# Patient Record
Sex: FEMALE | Race: WHITE
[De-identification: names, ages, dates, MRNs, and addresses within clinical notes are randomized per-mention and may not be internally consistent; named-entity substitution may affect disease eponyms.]

---

## 2017-08-19 ENCOUNTER — HEALTH MAINTENANCE LETTER (OUTPATIENT)
Age: 52
End: 2017-08-19

## 2017-11-17 ENCOUNTER — TELEPHONE (OUTPATIENT)
Dept: FAMILY MEDICINE | Facility: CLINIC | Age: 52
End: 2017-11-17

## 2017-11-18 NOTE — TELEPHONE ENCOUNTER
"11/17/17    Patient is due for a Cervical/Pap screening.    Patient Communication Preferences indicate  Do not contact  and/or communication by \"Phone\" is not preferred. Call not required per Outreach team.    Suze Caldera  "

## 2017-12-04 ENCOUNTER — OFFICE VISIT (OUTPATIENT)
Dept: FAMILY MEDICINE | Facility: CLINIC | Age: 52
End: 2017-12-04
Payer: COMMERCIAL

## 2017-12-04 VITALS
DIASTOLIC BLOOD PRESSURE: 68 MMHG | HEART RATE: 64 BPM | OXYGEN SATURATION: 99 % | RESPIRATION RATE: 16 BRPM | TEMPERATURE: 97.8 F | HEIGHT: 66 IN | SYSTOLIC BLOOD PRESSURE: 102 MMHG | WEIGHT: 121 LBS | BODY MASS INDEX: 19.44 KG/M2

## 2017-12-04 DIAGNOSIS — K21.9 GASTROESOPHAGEAL REFLUX DISEASE WITHOUT ESOPHAGITIS: ICD-10-CM

## 2017-12-04 DIAGNOSIS — G43.009 MIGRAINE WITHOUT AURA AND WITHOUT STATUS MIGRAINOSUS, NOT INTRACTABLE: ICD-10-CM

## 2017-12-04 DIAGNOSIS — Z00.00 ROUTINE GENERAL MEDICAL EXAMINATION AT A HEALTH CARE FACILITY: Primary | ICD-10-CM

## 2017-12-04 DIAGNOSIS — Z12.31 ENCOUNTER FOR SCREENING MAMMOGRAM FOR BREAST CANCER: ICD-10-CM

## 2017-12-04 DIAGNOSIS — Z23 NEED FOR VACCINATION: ICD-10-CM

## 2017-12-04 DIAGNOSIS — R87.618 UNEXPLAINED ENDOMETRIAL CELLS ON CERVICAL PAP SMEAR: ICD-10-CM

## 2017-12-04 LAB — HGB BLD-MCNC: 14.1 G/DL (ref 11.7–15.7)

## 2017-12-04 PROCEDURE — 99396 PREV VISIT EST AGE 40-64: CPT | Mod: 25 | Performed by: FAMILY MEDICINE

## 2017-12-04 PROCEDURE — 36415 COLL VENOUS BLD VENIPUNCTURE: CPT | Performed by: FAMILY MEDICINE

## 2017-12-04 PROCEDURE — 80053 COMPREHEN METABOLIC PANEL: CPT | Performed by: FAMILY MEDICINE

## 2017-12-04 PROCEDURE — 99213 OFFICE O/P EST LOW 20 MIN: CPT | Mod: 25 | Performed by: FAMILY MEDICINE

## 2017-12-04 PROCEDURE — 85018 HEMOGLOBIN: CPT | Performed by: FAMILY MEDICINE

## 2017-12-04 PROCEDURE — 90471 IMMUNIZATION ADMIN: CPT | Performed by: FAMILY MEDICINE

## 2017-12-04 PROCEDURE — G0145 SCR C/V CYTO,THINLAYER,RESCR: HCPCS | Performed by: FAMILY MEDICINE

## 2017-12-04 PROCEDURE — 80061 LIPID PANEL: CPT | Performed by: FAMILY MEDICINE

## 2017-12-04 PROCEDURE — 86803 HEPATITIS C AB TEST: CPT | Performed by: FAMILY MEDICINE

## 2017-12-04 PROCEDURE — 87624 HPV HI-RISK TYP POOLED RSLT: CPT | Performed by: FAMILY MEDICINE

## 2017-12-04 PROCEDURE — 90714 TD VACC NO PRESV 7 YRS+ IM: CPT | Performed by: FAMILY MEDICINE

## 2017-12-04 RX ORDER — RIZATRIPTAN BENZOATE 5 MG/1
5-10 TABLET ORAL
Qty: 18 TABLET | Refills: 3 | Status: SHIPPED | OUTPATIENT
Start: 2017-12-04

## 2017-12-04 NOTE — LETTER
February 22, 2018      Oren Lanza  [address] [facility] [address]        Dear ,    We are writing to inform you of your test results, we have tried multiple times to reach you by phone.    Please make an appointment for an endometrial biopsy, your last 2 pap smears have shown endometrial cells.     Resulted Orders   Pap imaged thin layer screen with HPV - recommended age 30 - 65 years (select HPV order below)   Result Value Ref Range    PAP OTHER-NIL, See Result     Copath Report         Patient Name: OREN LANZA  MR#: [id number]  Specimen #: [id number]  Collected: 12/4/2017  Received: 12/5/2017  Reported: 12/6/2017 10:24  Ordering Phy(s): NINFA MALLORY    For improved result formatting, select 'View Enhanced Report Format'  under Linked Documents section.    SPECIMEN/STAIN PROCESS:  Pap imaged thin layer prep screening (Surepath, FocalPoint with guided  screening)       Pap-Cyto x 1, HPV ordered x 1    SOURCE: Cervical, endocervical  ----------------------------------------------------------------   Pap imaged thin layer prep screening (Surepath, FocalPoint with guided  screening)  SPECIMEN ADEQUACY:  Satisfactory for evaluation.  -Transformation zone component present.    CYTOLOGIC INTERPRETATION:    Other: Negative for Intraepithelial Lesion or Malignancy, See  interpretation/Result.         Endometrial cells present in a woman 45 years of age or older     -Endometrial cells correlate with the menstrual history provided.    Electronically signed out by:  SHANA Morales (ASCP)    Processed and screened at [facility] [place] [facility],  [facility]    CLINICAL HISTORY:  LMP: 11/28/2017  Previous Other-NIL EM in a woman 45 years of age or older  Date of Last Pap: 6/17/2016,    Papanicolaou Test Limitations:  Cervical cytology is a screening test  with limited sensitivity; regular screening is critical for cancer  prevention; Pap tests are primarily  effective for the  diagnosis/prevention of squamous cell carcinoma, not adenocarcinomas or  other cancers.    TESTING LAB LOCATION:  [address]  [address] [phone]    COLLECTION SITE:  Client:  [facility]  Location: [facility] (S)     HPV High Risk Types DNA Cervical   Result Value Ref Range    HPV 16 DNA Negative NEG^Negative    HPV 18 DNA Negative NEG^Negative    Other HR HPV Negative NEG^Negative    Final Diagnosis This patient's sample is negative for HPV DNA.       Comment:      (Note)  METHODOLOGY:  The Roche alex 4800 system uses automated extraction,   simultaneous amplification of HPV (L1 region) and beta-globin,    followed by  real time detection of fluorescent labeled HPV and beta   globin using specific oligonucleotide probes . The test specifically   identifies types HPV 16 DNA and HPV 18 DNA while concurrently   detecting the rest of the high risk types (31, 33, 35, 39, 45, 51,   52, 56, 58, 59, 66 or 68).  COMMENTS:  This test is not intended for use as a screening device   for women under age 30 with normal cervical cytology.  Results should   be correlated with cytologic and histologic findings. Close clinical   followup is recommended.  This test was developed and its performance characteristics   determined by the [facility]. It has not been cleared or approved by the   FDA. The laboratory is regulated under CLIA as qualified to perform   high-complexity testing. This test is used   for clinical purposes. It   should not be regarded as investigational or for research.      Specimen Description Cervical Cells       Comment:      [id number]       Please call the clinic at the number listed above.       Sincerely,    Raegan Becerril MD

## 2017-12-04 NOTE — MR AVS SNAPSHOT
After Visit Summary   12/4/2017    Carmen Sánchez    MRN: [id number]           Patient Information     Date Of Birth          1965        Visit Information        Provider Department      12/4/2017 9:40 AM Raegan Becerril MD [facility]        Today's Diagnoses     Routine general medical examination at a health care facility    -  1    Migraine without aura and without status migrainosus, not intractable        Gastroesophageal reflux disease without esophagitis        Encounter for screening mammogram for breast cancer          Care Instructions      Preventive Health Recommendations  Female Ages 50 - 64    Yearly exam: See your health care provider every year in order to  o Review health changes.   o Discuss preventive care.    o Review your medicines if your doctor has prescribed any.      Get a Pap test every three years (unless you have an abnormal result and your provider advises testing more often).    If you get Pap tests with HPV test, you only need to test every 5 years, unless you have an abnormal result.     You do not need a Pap test if your uterus was removed (hysterectomy) and you have not had cancer.    You should be tested each year for STDs (sexually transmitted diseases) if you're at risk.     Have a mammogram every 1 to 2 years.    Have a colonoscopy at age 50, or have a yearly FIT test (stool test). These exams screen for colon cancer.      Have a cholesterol test every 5 years, or more often if advised.    Have a diabetes test (fasting glucose) every three years. If you are at risk for diabetes, you should have this test more often.     If you are at risk for osteoporosis (brittle bone disease), think about having a bone density scan (DEXA).    Shots: Get a flu shot each year. Get a tetanus shot every 10 years.    Nutrition:     Eat at least 5 servings of fruits and vegetables each day.    Eat whole-grain bread, whole-wheat pasta and brown rice instead of  white grains and rice.    Talk to your provider about Calcium and Vitamin D.     Lifestyle    Exercise at least 150 minutes a week (30 minutes a day, 5 days a week). This will help you control your weight and prevent disease.    Limit alcohol to one drink per day.    No smoking.     Wear sunscreen to prevent skin cancer.     See your dentist every six months for an exam and cleaning.    See your eye doctor every 1 to 2 years.            Follow-ups after your visit        Follow-up notes from your care team     Return in about 2 weeks (around 12/18/2017), or if symptoms worsen or fail to improve.      Future tests that were ordered for you today     Open Future Orders        Priority Expected Expires Ordered    *MA Screening Digital Bilateral Routine  12/4/2018 12/4/2017            Who to contact     If you have questions or need follow up information about today's clinic visit or your schedule please contact [facility] [place] directly at [phone].  Normal or non-critical lab and imaging results will be communicated to you by HAM-IThart, letter or phone within 4 business days after the clinic has received the results. If you do not hear from us within 7 days, please contact the clinic through HAM-IThart or phone. If you have a critical or abnormal lab result, we will notify you by phone as soon as possible.  Submit refill requests through Viableware or call your pharmacy and they will forward the refill request to us. Please allow 3 business days for your refill to be completed.          Additional Information About Your Visit        Viableware Information     Viableware gives you secure access to your electronic health record. If you see a primary care provider, you can also send messages to your care team and make appointments. If you have questions, please call your primary care clinic.  If you do not have a primary care provider, please call [phone] and they will assist you.        Care EveryWhere ID      "This is your Care EveryWhere ID. This could be used by other organizations to access your [place] medical records  [id number]        Your Vitals Were     Pulse Temperature Respirations Height Last Period Pulse Oximetry    64 97.8  F (36.6  C) 16 5' 6\" (1.676 m) 11/28/2017 (Exact Date) 99%    BMI (Body Mass Index)                   19.53 kg/m2            Blood Pressure from Last 3 Encounters:   12/04/17 102/68   06/17/16 115/78   02/23/16 100/64    Weight from Last 3 Encounters:   12/04/17 121 lb (54.9 kg)   06/17/16 120 lb (54.4 kg)   02/23/16 119 lb 12.8 oz (54.3 kg)              We Performed the Following     Comprehensive metabolic panel     Hemoglobin     Hepatitis C Screen Reflex to HCV RNA Quant and Genotype     HPV High Risk Types DNA Cervical     Lipid Profile     Pap imaged thin layer screen with HPV - recommended age 30 - 65 years (select HPV order below)          Today's Medication Changes          These changes are accurate as of: 12/4/17 10:44 AM.  If you have any questions, ask your nurse or doctor.               Start taking these medicines.        Dose/Directions    rizatriptan 5 MG tablet   Commonly known as:  MAXALT   Used for:  Migraine without aura and without status migrainosus, not intractable   Started by:  Raegan Becerril MD        Dose:  5-10 mg   Take 1-2 tablets (5-10 mg) by mouth at onset of headache for migraine May repeat in 2 hours. Max 6 tablets/24 hours.   Quantity:  18 tablet   Refills:  3         These medicines have changed or have updated prescriptions.        Dose/Directions    esomeprazole 20 MG CR capsule   Commonly known as:  nexIUM   This may have changed:  when to take this   Used for:  Gastroesophageal reflux disease without esophagitis   Changed by:  Raegan Becerril MD        Dose:  20 mg   Take 1 capsule (20 mg) by mouth 2 times daily (before meals)   Quantity:  60 capsule   Refills:  3         Stop taking these medicines if you haven't already. Please contact your care " team if you have questions.     SUMAtriptan 100 MG tablet   Commonly known as:  IMITREX   Stopped by:  Raegan Becerril MD                Where to get your medicines      These medications were sent to [facility] [id number] [facility] - [place] - [address]  [address], [place] [address]     Phone:  [phone]     esomeprazole 20 MG CR capsule    rizatriptan 5 MG tablet                Primary Care Provider Office Phone # Fax #    Raegan Becerril [phone]       [address]  [place] [address]        Equal Access to Services     [facility]: Hadii aad ku hadasho Soomaali, waaxda luqadaha, qaybta kaalmada adeegyada, waxay marysolin haysunil castro . So [facility] [phone].    ATENCIÓN: Si habla español, tiene a frances disposición servicios gratuitos de asistencia lingüística. [facility] [phone].    We comply with applicable federal civil rights laws and [place] laws. We do not discriminate on the basis of race, color, national origin, age, disability, sex, sexual orientation, or gender identity.            Thank you!     Thank you for choosing [facility] [place]  for your care. Our goal is always to provide you with excellent care. Hearing back from our patients is one way we can continue to improve our services. Please take a few minutes to complete the written survey that you may receive in the mail after your visit with us. Thank you!             Your Updated Medication List - Protect others around you: Learn how to safely use, store and throw away your medicines at www.disposemymeds.org.          This list is accurate as of: 12/4/17 10:44 AM.  Always use your most recent med list.                   Brand Name Dispense Instructions for use Diagnosis    esomeprazole 20 MG CR capsule    nexIUM    60 capsule    Take 1 capsule (20 mg) by mouth 2 times daily (before meals)    Gastroesophageal reflux disease without esophagitis       loratadine 10 MG tablet     CLARITIN     Take 10 mg by mouth daily        rizatriptan 5 MG tablet    MAXALT    18 tablet    Take 1-2 tablets (5-10 mg) by mouth at onset of headache for migraine May repeat in 2 hours. Max 6 tablets/24 hours.    Migraine without aura and without status migrainosus, not intractable

## 2017-12-04 NOTE — PROGRESS NOTES
"Chief Complaint   Patient presents with     Physical       Initial /68  Pulse 64  Temp 97.8  F (36.6  C)  Resp 16  Ht 5' 6\" (1.676 m)  Wt 121 lb (54.9 kg)  LMP 11/28/2017 (Exact Date)  SpO2 99%  BMI 19.53 kg/m2 Estimated body mass index is 19.53 kg/(m^2) as calculated from the following:    Height as of this encounter: 5' 6\" (1.676 m).    Weight as of this encounter: 121 lb (54.9 kg).  Medication Reconciliation: complete. GERSON Olivares LPN       SUBJECTIVE:   CC: Carmen Sánchez is an 52 year old woman who presents for preventive health visit.     Healthy Habits:    Do you get at least three servings of calcium containing foods daily (dairy, green leafy vegetables, etc.)? yes    Amount of exercise or daily activities, outside of work: 2/3 day(s) per week    Problems taking medications regularly No    Medication side effects: No    Have you had an eye exam in the past two years? yes    Do you see a dentist twice per year? yes    Do you have sleep apnea, excessive snoring or daytime drowsiness?no        Complains of GERD. Symptoms are not well controlled. She often feels acid coming up her throat. Coughs off and on. History of stomach ulcers in the past. Denies any dark stool. She is currently taking Nexium 20 mg daily for the last 1 week   And has noted slight improvement.      Complain of migraine headaches. Headaches have maybe once or twice in 2 weeks. She cannot use Imitrex because that causes perioral tingling. Excedrin helps but that causes worsening of her GERD.    Today's PHQ-2 Score:   PHQ-2 ( 1999 Pfizer) 12/4/2017 6/17/2016   Q1: Little interest or pleasure in doing things 0 0   Q2: Feeling down, depressed or hopeless 0 0   PHQ-2 Score 0 0   Q1: Little interest or pleasure in doing things - -   Q2: Feeling down, depressed or hopeless - -   PHQ-2 Score - -         Abuse: Current or Past(Physical, Sexual or Emotional)- No  Do you feel safe in your environment - Yes  Social History   Substance " Use Topics     Smoking status: Never Smoker     Smokeless tobacco: Never Used     Alcohol use 2.4 - 3.0 oz/week     4 - 5 Standard drinks or equivalent per week      Comment: 4-5/wk     The patient does not drink >3 drinks per day nor >7 drinks per week.    Reviewed orders with patient.  Reviewed health maintenance and updated orders accordingly - Yes              Labs reviewed in EPIC  BP Readings from Last 3 Encounters:   12/04/17 102/68   06/17/16 115/78   02/23/16 100/64    Wt Readings from Last 3 Encounters:   12/04/17 121 lb (54.9 kg)   06/17/16 120 lb (54.4 kg)   02/23/16 119 lb 12.8 oz (54.3 kg)                  Patient Active Problem List   Diagnosis     Pruritus ani     CARDIOVASCULAR SCREENING; LDL GOAL LESS THAN 160     Esophageal reflux     Spina bifida occulta     Chronic SI joint pain     Gastric ulcer     Migraine without aura and without status migrainosus, not intractable     Seasonal allergic rhinitis     Past Surgical History:   Procedure Laterality Date     BIOPSY CERVICAL, LOCAL EXCISION, SINGLE/MULTIPLE  2010    excision benign cervical polyp        Social History   Substance Use Topics     Smoking status: Never Smoker     Smokeless tobacco: Never Used     Alcohol use 2.4 - 3.0 oz/week     4 - 5 Standard drinks or equivalent per week      Comment: 4-5/wk     Family History   Problem Relation Age of Onset     CANCER Mother      melanoma lower leg at age 63     Arthritis Father      rheumatoid     CANCER Sister 52     Uterine cancer     HEART DISEASE Maternal Grandmother      MI     Allergies Daughter      hay fever         Current Outpatient Prescriptions   Medication Sig Dispense Refill     rizatriptan (MAXALT) 5 MG tablet Take 1-2 tablets (5-10 mg) by mouth at onset of headache for migraine May repeat in 2 hours. Max 6 tablets/24 hours. 18 tablet 3     esomeprazole (NEXIUM) 20 MG CR capsule Take 1 capsule (20 mg) by mouth 2 times daily (before meals) 60 capsule 3     loratadine (CLARITIN) 10  "MG tablet Take 10 mg by mouth daily       Allergies   Allergen Reactions     Metronidazole      hives     Minocin      Penicillins      anaphylaxis           Patient over age 50, mutual decision to screen reflected in health maintenance.      Pertinent mammograms are reviewed under the imaging tab.  History of abnormal Pap smear: NO - age 30- 65 PAP every 3 years recommended    Reviewed and updated as needed this visit by clinical staffTobacco  Allergies  Meds  Fam Hx  Soc Hx        Reviewed and updated as needed this visit by Provider  Allergies  Meds              ROS:  C: NEGATIVE for fever, chills, change in weight  I: NEGATIVE for worrisome rashes, moles or lesions  E: NEGATIVE for vision changes or irritation  ENT: NEGATIVE for ear, mouth and throat problems  R: NEGATIVE for significant cough or SOB  B: NEGATIVE for masses, tenderness or discharge  CV: NEGATIVE for chest pain, palpitations or peripheral edema  GI: NEGATIVE for nausea, abdominal pain, heartburn, or change in bowel habits  : NEGATIVE for unusual urinary or vaginal symptoms. No vaginal bleeding.  M: NEGATIVE for significant arthralgias or myalgia  N: NEGATIVE for weakness, dizziness   P: NEGATIVE for changes in mood or affect     OBJECTIVE:   /68  Pulse 64  Temp 97.8  F (36.6  C)  Resp 16  Ht 5' 6\" (1.676 m)  Wt 121 lb (54.9 kg)  LMP 11/28/2017 (Exact Date)  SpO2 99%  BMI 19.53 kg/m2  EXAM:  GENERAL APPEARANCE: healthy, alert and no distress  EYES: Eyes grossly normal to inspection, PERRL and conjunctivae and sclerae normal  HENT: ear canals and TM's normal, nose and mouth without ulcers or lesions, oropharynx clear and oral mucous membranes moist  NECK: no adenopathy, no asymmetry, masses, or scars and thyroid normal to palpation  RESP: lungs clear to auscultation - no rales, rhonchi or wheezes  BREAST: normal without masses, tenderness or nipple discharge and no palpable axillary masses or adenopathy  CV: regular rate and " rhythm, normal S1 S2, no S3 or S4, no murmur, click or rub, no peripheral edema and peripheral pulses strong  ABDOMEN: soft, nontender, no hepatosplenomegaly, no masses and bowel sounds normal   (female): normal female external genitalia, normal urethral meatus, vaginal mucosal atrophy noted, normal cervix, adnexae, and uterus without masses or abnormal discharge  MS: no musculoskeletal defects are noted and gait is age appropriate without ataxia  SKIN: no suspicious lesions or rashes  NEURO: Normal strength and tone, sensory exam grossly normal, mentation intact and speech normal  PSYCH: mentation appears normal and affect normal/bright    ASSESSMENT/PLAN:   1. Routine general medical examination at a health care facility  Screening labs and pap obtained.   - Comprehensive metabolic panel  - Lipid Profile  - Hemoglobin  - Hepatitis C Screen Reflex to HCV RNA Quant and Genotype  - Pap imaged thin layer screen with HPV - recommended age 30 - 65 years (select HPV order below)  - HPV High Risk Types DNA Cervical    2. Migraine without aura and without status migrainosus, not intractable  I recommended a trial of Maxalt instead of Imitrex, as Imitrex cause perioral tingling.  - rizatriptan (MAXALT) 5 MG tablet; Take 1-2 tablets (5-10 mg) by mouth at onset of headache for migraine May repeat in 2 hours. Max 6 tablets/24 hours.  Dispense: 18 tablet; Refill: 3    3. Gastroesophageal reflux disease without esophagitis  Not well controlled. Recommended to increase the dose of Nexium to 20 mg b.i.d. Dietary modifications discussed. If symptoms are not improving in the next 2 weeks, she may need  EGD  - esomeprazole (NEXIUM) 20 MG CR capsule; Take 1 capsule (20 mg) by mouth 2 times daily (before meals)  Dispense: 60 capsule; Refill: 3    4. Encounter for screening mammogram for breast cancer    - *MA Screening Digital Bilateral; Future    5. Need for vaccination    - TD PRSERV FREE >=7 YRS ADS IM [59334]  - 1st   "Administration  [37364]    COUNSELING:   Reviewed preventive health counseling, as reflected in patient instructions       Regular exercise       Healthy diet/nutrition         reports that she has never smoked. She has never used smokeless tobacco.    Estimated body mass index is 19.53 kg/(m^2) as calculated from the following:    Height as of this encounter: 5' 6\" (1.676 m).    Weight as of this encounter: 121 lb (54.9 kg).         Counseling Resources:  ATP IV Guidelines  Pooled Cohorts Equation Calculator  Breast Cancer Risk Calculator  FRAX Risk Assessment  ICSI Preventive Guidelines  Dietary Guidelines for Americans, 2010  USDA's MyPlate  ASA Prophylaxis  Lung CA Screening    Raegan Becerril MD  [facility]  "

## 2017-12-05 LAB
ALBUMIN SERPL-MCNC: 4.4 G/DL (ref 3.4–5)
ALP SERPL-CCNC: 71 U/L (ref 40–150)
ALT SERPL W P-5'-P-CCNC: 19 U/L (ref 0–50)
ANION GAP SERPL CALCULATED.3IONS-SCNC: 9 MMOL/L (ref 3–14)
AST SERPL W P-5'-P-CCNC: 27 U/L (ref 0–45)
BILIRUB SERPL-MCNC: 0.5 MG/DL (ref 0.2–1.3)
BUN SERPL-MCNC: 10 MG/DL (ref 7–30)
CALCIUM SERPL-MCNC: 9.2 MG/DL (ref 8.5–10.1)
CHLORIDE SERPL-SCNC: 104 MMOL/L (ref 94–109)
CHOLEST SERPL-MCNC: 235 MG/DL
CO2 SERPL-SCNC: 25 MMOL/L (ref 20–32)
CREAT SERPL-MCNC: 0.78 MG/DL (ref 0.52–1.04)
GFR SERPL CREATININE-BSD FRML MDRD: 77 ML/MIN/1.7M2
GLUCOSE SERPL-MCNC: 79 MG/DL (ref 70–99)
HCV AB SERPL QL IA: NONREACTIVE
HDLC SERPL-MCNC: 93 MG/DL
LDLC SERPL CALC-MCNC: 123 MG/DL
NONHDLC SERPL-MCNC: 142 MG/DL
POTASSIUM SERPL-SCNC: 4 MMOL/L (ref 3.4–5.3)
PROT SERPL-MCNC: 7.6 G/DL (ref 6.8–8.8)
SODIUM SERPL-SCNC: 138 MMOL/L (ref 133–144)
TRIGL SERPL-MCNC: 96 MG/DL

## 2017-12-06 LAB
COPATH REPORT: NORMAL
PAP: NORMAL

## 2017-12-07 LAB
FINAL DIAGNOSIS: NORMAL
HPV HR 12 DNA CVX QL NAA+PROBE: NEGATIVE
HPV16 DNA SPEC QL NAA+PROBE: NEGATIVE
HPV18 DNA SPEC QL NAA+PROBE: NEGATIVE
SPECIMEN DESCRIPTION: NORMAL

## 2017-12-13 NOTE — PROGRESS NOTES
Please call the patient to notify patient that her pap smears shows the presence of uterine cells again. I would like her to get a pelvic US. It is ordered to [facility]. She may also need endometrial biopsy, but we will wait to see US results first.    Raegan Becerril MD

## 2017-12-15 ENCOUNTER — TRANSFERRED RECORDS (OUTPATIENT)
Dept: HEALTH INFORMATION MANAGEMENT | Facility: CLINIC | Age: 52
End: 2017-12-15

## 2017-12-15 ENCOUNTER — TELEPHONE (OUTPATIENT)
Dept: FAMILY MEDICINE | Facility: CLINIC | Age: 52
End: 2017-12-15

## 2017-12-15 NOTE — TELEPHONE ENCOUNTER
Ultrasound of the pelvis shows normal endometrial thickness.  Left ovary is normal in appearance.  Right ovary contains a benign-appearing cyst.    No concerning findings noted.     Patient has family history of uterine cancer in sister.  Due to that reason I would recommend that she gets an endometrial biopsy.  Please have her schedule that with me after [place].    Raegan Becerril MD  [facility] [place]

## 2017-12-15 NOTE — TELEPHONE ENCOUNTER
Non-detailed message left to return our call.  Edwige Magana RN - Triage  [facility]

## 2017-12-15 NOTE — TELEPHONE ENCOUNTER
Patient notified of test results and PCP's message, no further questions.  Darlene Frias RN  [facility]  [phone]

## 2017-12-18 ENCOUNTER — HOSPITAL ENCOUNTER (OUTPATIENT)
Dept: MAMMOGRAPHY | Facility: CLINIC | Age: 52
Discharge: HOME | End: 2017-12-18
Attending: FAMILY MEDICINE | Admitting: FAMILY MEDICINE
Payer: COMMERCIAL

## 2017-12-18 DIAGNOSIS — Z12.31 ENCOUNTER FOR SCREENING MAMMOGRAM FOR BREAST CANCER: ICD-10-CM

## 2017-12-18 PROCEDURE — G0202 SCR MAMMO BI INCL CAD: HCPCS

## 2017-12-28 ENCOUNTER — TRANSFERRED RECORDS (OUTPATIENT)
Dept: HEALTH INFORMATION MANAGEMENT | Facility: CLINIC | Age: 52
End: 2017-12-28

## 2018-02-20 NOTE — PROGRESS NOTES
Please call the patient to notify patient that she needs to come in for endometrial biopsy, as the last 2 pap smears have shown endometrial cells on it.  It needs to be a 40 min appointment.    Raegan Becerril MD

## 2018-03-01 ENCOUNTER — OFFICE VISIT (OUTPATIENT)
Dept: FAMILY MEDICINE | Facility: CLINIC | Age: 53
End: 2018-03-01
Payer: COMMERCIAL

## 2018-03-01 VITALS
SYSTOLIC BLOOD PRESSURE: 110 MMHG | WEIGHT: 123 LBS | HEART RATE: 60 BPM | TEMPERATURE: 98.5 F | DIASTOLIC BLOOD PRESSURE: 70 MMHG | BODY MASS INDEX: 19.85 KG/M2

## 2018-03-01 DIAGNOSIS — R87.618 UNEXPLAINED ENDOMETRIAL CELLS ON CERVICAL PAP SMEAR: Primary | ICD-10-CM

## 2018-03-01 LAB — BETA HCG QUAL IFA URINE: NEGATIVE

## 2018-03-01 PROCEDURE — 88305 TISSUE EXAM BY PATHOLOGIST: CPT | Performed by: FAMILY MEDICINE

## 2018-03-01 PROCEDURE — 84703 CHORIONIC GONADOTROPIN ASSAY: CPT | Performed by: FAMILY MEDICINE

## 2018-03-01 PROCEDURE — 58100 BIOPSY OF UTERUS LINING: CPT | Performed by: FAMILY MEDICINE

## 2018-03-01 NOTE — MR AVS SNAPSHOT
After Visit Summary   3/1/2018    Carmen Sánchez    MRN: [id number]           Patient Information     Date Of Birth          1965        Visit Information        Provider Department      3/1/2018 1:00 PM Raegan Becerril MD; EC [facility]        Today's Diagnoses     Unexplained endometrial cells on cervical Pap smear    -  1       Follow-ups after your visit        Follow-up notes from your care team     Return in about 9 months (around 12/4/2018) for Annual Physical Exam.      Who to contact     If you have questions or need follow up information about today's clinic visit or your schedule please contact [facility] directly at [phone].  Normal or non-critical lab and imaging results will be communicated to you by MyChart, letter or phone within 4 business days after the clinic has received the results. If you do not hear from us within 7 days, please contact the clinic through [employer] or phone. If you have a critical or abnormal lab result, we will notify you by phone as soon as possible.  Submit refill requests through [employer] or call your pharmacy and they will forward the refill request to us. Please allow 3 business days for your refill to be completed.          Additional Information About Your Visit        MyChart Information     [employer] gives you secure access to your electronic health record. If you see a primary care provider, you can also send messages to your care team and make appointments. If you have questions, please call your primary care clinic.  If you do not have a primary care provider, please call [phone] and they will assist you.        Care EveryWhere ID     This is your Care EveryWhere ID. This could be used by other organizations to access your [place] medical records  [id number]        Your Vitals Were     Pulse Temperature Last Period BMI (Body Mass Index)          60 98.5  F (36.9  C) (Tympanic) 02/15/2018  19.85 kg/m2         Blood Pressure from Last 3 Encounters:   03/01/18 110/70   12/04/17 102/68   06/17/16 115/78    Weight from Last 3 Encounters:   03/01/18 123 lb (55.8 kg)   12/04/17 121 lb (54.9 kg)   06/17/16 120 lb (54.4 kg)              We Performed the Following     Beta HCG Qual, Urine - [facility] [place] ([id number])        Primary Care Provider Office Phone # Fax #    Raegan Becerril [phone]       [address]  [place] [address]        Equal Access to Services     [facility]: Hadii aad ku hadasho Sohuyenali, waaxda luqadaha, qaybta kaalmada adeegyada, musa castro . So [facility] [phone].    ATENCIÓN: Si habla español, tiene a frances disposición servicios gratuitos de asistencia lingüística. Llame al [phone].    We comply with applicable federal civil rights laws and [place] laws. We do not discriminate on the basis of race, color, national origin, age, disability, sex, sexual orientation, or gender identity.            Thank you!     Thank you for choosing [facility] [place]  for your care. Our goal is always to provide you with excellent care. Hearing back from our patients is one way we can continue to improve our services. Please take a few minutes to complete the written survey that you may receive in the mail after your visit with us. Thank you!             Your Updated Medication List - Protect others around you: Learn how to safely use, store and throw away your medicines at www.disposemymeds.org.          This list is accurate as of 3/1/18  1:19 PM.  Always use your most recent med list.                   Brand Name Dispense Instructions for use Diagnosis    esomeprazole 20 MG CR capsule    nexIUM    60 capsule    Take 1 capsule (20 mg) by mouth 2 times daily (before meals)    Gastroesophageal reflux disease without esophagitis       loratadine 10 MG tablet    CLARITIN     Take 10 mg by mouth daily        rizatriptan 5 MG  tablet    MAXALT    18 tablet    Take 1-2 tablets (5-10 mg) by mouth at onset of headache for migraine May repeat in 2 hours. Max 6 tablets/24 hours.    Migraine without aura and without status migrainosus, not intractable

## 2018-03-01 NOTE — PROGRESS NOTES
SUBJECTIVE:   Carmen Sánchez is a 52 year old female who presents to clinic today for the following health issues:      Patient comes in today for endometrial biopsy .     Patient's last menstrual period was 02/15/2018. patient has been getting periods irregularly.     H/o endometrial cells on pap smear noted twice in 2 consecutive years.   US pelvis shows normal endometrium.  Based on this information, EMB was recommended.   /70  Pulse 60  Temp 98.5  F (36.9  C) (Tympanic)  Wt 123 lb (55.8 kg)  LMP 02/15/2018  BMI 19.85 kg/m2    Results for orders placed or performed in visit on 03/01/18   Beta HCG Qual, Urine - [facility] [place] ([id number])   Result Value Ref Range    Beta HCG Qual IFA Urine Negative NEG^Negative          Endometrial Biopsy was performed as follows: A speculum placed in   vagina with good visualization of cervix; cervix swabbed x3 with   Betadine solution. Anterior lip of cervix grasped with single   toothed tenaculum; endometrial sampling achieved with Pipelle   sampling unit. Tissue collected, labelled and sent to Pathology.   Instruments removed, hemostasis achieved with ease. The patient   tolerated the procedure well.    1. Unexplained endometrial cells on cervical Pap smear    - Beta HCG Qual, Urine - [facility] [place] ([id number])  - Surgical pathology exam  - ENDOMETRIAL BIOPSY W/O CERVICAL DILATION    Await the test results.    Raegan Becerril MD  [facility]

## 2018-03-05 LAB — COPATH REPORT: NORMAL

## 2018-03-05 NOTE — PROGRESS NOTES
Please call the patient to notify patient that the endometrial biopsy is benign.  Repeat Pap smear in 1 year recommended.    Raegan Becerril MD

## 2018-04-23 ENCOUNTER — OFFICE VISIT (OUTPATIENT)
Dept: FAMILY MEDICINE | Facility: CLINIC | Age: 53
End: 2018-04-23
Payer: COMMERCIAL

## 2018-04-23 VITALS
OXYGEN SATURATION: 99 % | HEIGHT: 66 IN | HEART RATE: 105 BPM | SYSTOLIC BLOOD PRESSURE: 100 MMHG | TEMPERATURE: 100 F | BODY MASS INDEX: 19.29 KG/M2 | DIASTOLIC BLOOD PRESSURE: 62 MMHG | WEIGHT: 120 LBS

## 2018-04-23 DIAGNOSIS — J01.90 ACUTE SINUSITIS WITH SYMPTOMS > 10 DAYS: Primary | ICD-10-CM

## 2018-04-23 PROCEDURE — 99213 OFFICE O/P EST LOW 20 MIN: CPT | Performed by: FAMILY MEDICINE

## 2018-04-23 RX ORDER — AZITHROMYCIN 250 MG/1
TABLET, FILM COATED ORAL
Qty: 6 TABLET | Refills: 0 | Status: SHIPPED | OUTPATIENT
Start: 2018-04-23 | End: 2019-06-19

## 2018-04-23 NOTE — PROGRESS NOTES
SUBJECTIVE:   Carmen Sánchez is a 52 year old female who presents to clinic today for the following health issues:      Acute Illness   Acute illness concerns: cough and sinus  Onset: x 12 days    Fever: YES- low grade    Chills/Sweats: YES    Headache (location?): no    Sinus Pressure:YES- post-nasal drainage and facial pain    Conjunctivitis:  no    Ear Pain: YES-     Rhinorrhea: YES    Congestion: YES    Sore Throat: YES     Cough: YES    Wheeze: no    Decreased Appetite: no    Nausea: no    Vomiting: no    Diarrhea:  no    Dysuria/Freq.: no    Fatigue/Achiness: YES    Sick/Strep Exposure: no     Therapies Tried and outcome: unable to take ibuprofen due to reflux issues          Problem list and histories reviewed & adjusted, as indicated.  Additional history: as documented    Patient Active Problem List   Diagnosis     Pruritus ani     CARDIOVASCULAR SCREENING; LDL GOAL LESS THAN 160     Esophageal reflux     Spina bifida occulta     Chronic SI joint pain     Gastric ulcer     Migraine without aura and without status migrainosus, not intractable     Seasonal allergic rhinitis     Unexplained endometrial cells on cervical Pap smear     Past Surgical History:   Procedure Laterality Date     BIOPSY CERVICAL, LOCAL EXCISION, SINGLE/MULTIPLE  2010    excision benign cervical polyp        Social History   Substance Use Topics     Smoking status: Never Smoker     Smokeless tobacco: Never Used     Alcohol use 2.4 - 3.0 oz/week     4 - 5 Standard drinks or equivalent per week      Comment: 4-5/wk     Family History   Problem Relation Age of Onset     CANCER Mother      melanoma lower leg at age 63     Arthritis Father      rheumatoid     CANCER Sister 52     Uterine cancer     HEART DISEASE Maternal Grandmother      MI     Allergies Daughter      hay fever         Current Outpatient Prescriptions   Medication Sig Dispense Refill     azithromycin (ZITHROMAX) 250 MG tablet Two tablets first day, then one tablet daily  "for four days. 6 tablet 0     esomeprazole (NEXIUM) 20 MG CR capsule Take 1 capsule (20 mg) by mouth 2 times daily (before meals) 60 capsule 3     loratadine (CLARITIN) 10 MG tablet Take 10 mg by mouth daily       rizatriptan (MAXALT) 5 MG tablet Take 1-2 tablets (5-10 mg) by mouth at onset of headache for migraine May repeat in 2 hours. Max 6 tablets/24 hours. 18 tablet 3     Allergies   Allergen Reactions     Metronidazole      hives     Minocin      Penicillins      anaphylaxis       Reviewed and updated as needed this visit by clinical staff  Tobacco  Allergies  Meds  Med Hx  Surg Hx  Fam Hx  Soc Hx      Reviewed and updated as needed this visit by Provider         ROS:  INTEGUMENTARY/SKIN: NEGATIVE for worrisome rashes, moles or lesions  GI: NEGATIVE for nausea, abdominal pain, heartburn, or change in bowel habits    OBJECTIVE:                                                    /62  Pulse 105  Temp 100  F (37.8  C) (Tympanic)  Ht 5' 6\" (1.676 m)  Wt 120 lb (54.4 kg)  SpO2 99%  BMI 19.37 kg/m2  Body mass index is 19.37 kg/(m^2).   GENERAL: healthy, alert, well nourished, well hydrated, no distress  HENT: ear canals- normal; TMs- normal; Nose- normal; bilateral maxillary sinus tenderness noted mouth- no ulcers, no lesions  NECK: no tenderness, no adenopathy, no asymmetry, no masses, no stiffness; thyroid- normal to palpation  RESP: lungs clear to auscultation - no rales, no rhonchi, no wheezes  CV: regular rates and rhythm, normal S1 S2, no S3 or S4 and no murmur, no click or rub -         ASSESSMENT/PLAN:                                                        ICD-10-CM    1. Acute sinusitis with symptoms > 10 days J01.90 azithromycin (ZITHROMAX) 250 MG tablet     Start patient on azithromycin for acute sinus infection.  Use nasal saline twice a day as needed recommended to stay well-hydrated.  If symptoms are not improving in the next few days, instructed to notify me back.      Raegan Becerril, " MD  [facility] [place]

## 2018-04-23 NOTE — NURSING NOTE
"Chief Complaint   Patient presents with     Cough     Sinus Problem       Initial /62  Pulse 105  Temp 100  F (37.8  C) (Tympanic)  Ht 5' 6\" (1.676 m)  Wt 120 lb (54.4 kg)  SpO2 99%  BMI 19.37 kg/m2 Estimated body mass index is 19.37 kg/(m^2) as calculated from the following:    Height as of this encounter: 5' 6\" (1.676 m).    Weight as of this encounter: 120 lb (54.4 kg).  Medication Reconciliation: complete   Janet Martin CMA      "

## 2018-04-23 NOTE — MR AVS SNAPSHOT
"              After Visit Summary   4/23/2018    Carmen Sánchez    MRN: [id number]           Patient Information     Date Of Birth          1965        Visit Information        Provider Department      4/23/2018 1:40 PM Raegan Becerril MD [facility] [place]        Today's Diagnoses     Acute sinusitis with symptoms > 10 days    -  1       Follow-ups after your visit        Follow-up notes from your care team     Return in about 7 months (around 12/4/2018) for Annual Physical Exam.      Who to contact     If you have questions or need follow up information about today's clinic visit or your schedule please contact [facility] [place] directly at [phone].  Normal or non-critical lab and imaging results will be communicated to you by MyChart, letter or phone within 4 business days after the clinic has received the results. If you do not hear from us within 7 days, please contact the clinic through [employer] or phone. If you have a critical or abnormal lab result, we will notify you by phone as soon as possible.  Submit refill requests through [employer] or call your pharmacy and they will forward the refill request to us. Please allow 3 business days for your refill to be completed.          Additional Information About Your Visit        MyChart Information     [employer] gives you secure access to your electronic health record. If you see a primary care provider, you can also send messages to your care team and make appointments. If you have questions, please call your primary care clinic.  If you do not have a primary care provider, please call [phone] and they will assist you.        Care EveryWhere ID     This is your Care EveryWhere ID. This could be used by other organizations to access your [facility] medical records  [id number]        Your Vitals Were     Pulse Temperature Height Pulse Oximetry BMI (Body Mass Index)       105 100  F (37.8  C) (Tympanic) 5' 6\" (1.676 m) 99% 19.37 " kg/m2        Blood Pressure from Last 3 Encounters:   04/23/18 100/62   03/01/18 110/70   12/04/17 102/68    Weight from Last 3 Encounters:   04/23/18 120 lb (54.4 kg)   03/01/18 123 lb (55.8 kg)   12/04/17 121 lb (54.9 kg)              Today, you had the following     No orders found for display         Today's Medication Changes          These changes are accurate as of 4/23/18  2:02 PM.  If you have any questions, ask your nurse or doctor.               Start taking these medicines.        Dose/Directions    azithromycin 250 MG tablet   Commonly known as:  ZITHROMAX   Used for:  Acute sinusitis with symptoms > 10 days   Started by:  Raegan Becerril MD        Two tablets first day, then one tablet daily for four days.   Quantity:  6 tablet   Refills:  0            Where to get your medicines      These medications were sent to [facility] [place] [address]  [address], [place] [address]     Phone:  [phone]     azithromycin 250 MG tablet                Primary Care Provider Office Phone # Fax #    Raegan Becerril [phone]       [address] [place] [address] [address]  [place] [address]        Equal Access to Services     [facility]: Hadii juan ku hadasho Soomaali, waaxda luqadaha, qaybta kaalmada adeegyada, waxay marysolin hayconorn manuel castillo. So [facility] [phone].    ATENCIÓN: Si habla español, tiene a frances disposición servicios gratuitos de asistencia lingüística. Llame al [phone].    We comply with applicable federal civil rights laws and [place] laws. We do not discriminate on the basis of race, color, national origin, age, disability, sex, sexual orientation, or gender identity.            Thank you!     Thank you for choosing [facility] [place]  for your care. Our goal is always to provide you with excellent care. Hearing back from our patients is one way we can continue to improve our services. Please take a few  minutes to complete the written survey that you may receive in the mail after your visit with us. Thank you!             Your Updated Medication List - Protect others around you: Learn how to safely use, store and throw away your medicines at www.disposemymeds.org.          This list is accurate as of 4/23/18  2:02 PM.  Always use your most recent med list.                   Brand Name Dispense Instructions for use Diagnosis    azithromycin 250 MG tablet    ZITHROMAX    6 tablet    Two tablets first day, then one tablet daily for four days.    Acute sinusitis with symptoms > 10 days       esomeprazole 20 MG CR capsule    nexIUM    60 capsule    Take 1 capsule (20 mg) by mouth 2 times daily (before meals)    Gastroesophageal reflux disease without esophagitis       loratadine 10 MG tablet    CLARITIN     Take 10 mg by mouth daily        rizatriptan 5 MG tablet    MAXALT    18 tablet    Take 1-2 tablets (5-10 mg) by mouth at onset of headache for migraine May repeat in 2 hours. Max 6 tablets/24 hours.    Migraine without aura and without status migrainosus, not intractable

## 2018-04-25 ENCOUNTER — TELEPHONE (OUTPATIENT)
Dept: FAMILY MEDICINE | Facility: CLINIC | Age: 53
End: 2018-04-25

## 2018-04-25 DIAGNOSIS — R05.9 COUGH: Primary | ICD-10-CM

## 2018-04-25 RX ORDER — CODEINE PHOSPHATE AND GUAIFENESIN 10; 100 MG/5ML; MG/5ML
1 SOLUTION ORAL EVERY 6 HOURS PRN
Qty: 180 ML | Refills: 0 | Status: SHIPPED | OUTPATIENT
Start: 2018-04-25 | End: 2019-06-19

## 2018-04-25 NOTE — TELEPHONE ENCOUNTER
TC discussed with Provider  She is not redoing the script  TC walked it over to the clinic pharmacy  Sue AGUILAR

## 2018-04-25 NOTE — TELEPHONE ENCOUNTER
Patient notified with information noted below from provider and agrees with plan.  Can bring script to our on site pharmacy and patient  will  about 5.  Edwige Magana RN - Triage  [facility]

## 2018-04-25 NOTE — TELEPHONE ENCOUNTER
Patient calling to report that since seen on Monday her cough has progressed.  She reports that she has some pain now when coughing and has been getting decreased sleep.  Her cough is productive of yellow sputum. Denies any wheezing.  She has been using OTC cough meds and not getting relief.  Patient wondering if she can get something stronger.    Please advise,  Edwige Magana RN - Triage  [facility] 4/23/2018  1. Acute sinusitis with symptoms > 10 days J01.90 azithromycin (ZITHROMAX) 250 MG tablet      Start patient on azithromycin for acute sinus infection.  Use nasal saline twice a day as needed recommended to stay well-hydrated.  If symptoms are not improving in the next few days, instructed to notify me back.

## 2018-04-25 NOTE — TELEPHONE ENCOUNTER
Cough medication with codeine ordered.    Raegan Becerril MD  [facility] [place]

## 2018-09-18 ENCOUNTER — TRANSFERRED RECORDS (OUTPATIENT)
Dept: HEALTH INFORMATION MANAGEMENT | Facility: CLINIC | Age: 53
End: 2018-09-18

## 2018-12-26 ENCOUNTER — HOSPITAL ENCOUNTER (OUTPATIENT)
Dept: MAMMOGRAPHY | Facility: CLINIC | Age: 53
Discharge: HOME | End: 2018-12-26
Attending: FAMILY MEDICINE | Admitting: FAMILY MEDICINE
Payer: COMMERCIAL

## 2018-12-26 DIAGNOSIS — Z12.31 VISIT FOR SCREENING MAMMOGRAM: ICD-10-CM

## 2018-12-26 PROCEDURE — 77067 SCR MAMMO BI INCL CAD: CPT

## 2019-02-14 LAB
ALT SERPL-CCNC: 11 IU/L (ref 0–32)
AST SERPL-CCNC: 21 IU/L (ref 0–40)
CHOLEST SERPL-MCNC: 214 MG/DL (ref 100–199)
CREAT SERPL-MCNC: 0.7 MG/DL (ref 0.57–1)
GFR SERPL CREATININE-BSD FRML MDRD: 99 ML/MIN/1.73M2
GLUCOSE SERPL-MCNC: 87 MG/DL (ref 65–99)
HDLC SERPL-MCNC: 86 MG/DL
LDLC SERPL CALC-MCNC: 117 MG/DL (ref 0–99)
POTASSIUM SERPL-SCNC: 4.3 MMOL/L (ref 3.5–5.2)
TRIGL SERPL-MCNC: 57 MG/DL (ref 0–149)
TSH SERPL-ACNC: 6.95 UIU/ML (ref 0.45–4.5)
TSH SERPL-ACNC: 7.08 UIU/ML (ref 0.45–4.5)

## 2019-02-15 ENCOUNTER — HEALTH MAINTENANCE LETTER (OUTPATIENT)
Age: 54
End: 2019-02-15

## 2019-02-27 ENCOUNTER — TRANSFERRED RECORDS (OUTPATIENT)
Dept: HEALTH INFORMATION MANAGEMENT | Facility: CLINIC | Age: 54
End: 2019-02-27

## 2019-04-22 ENCOUNTER — TRANSFERRED RECORDS (OUTPATIENT)
Dept: HEALTH INFORMATION MANAGEMENT | Facility: CLINIC | Age: 54
End: 2019-04-22

## 2019-04-22 LAB — TSH SERPL-ACNC: 5.04 UIU/ML (ref 0.45–4.5)

## 2019-06-19 ENCOUNTER — OFFICE VISIT (OUTPATIENT)
Dept: FAMILY MEDICINE | Facility: CLINIC | Age: 54
End: 2019-06-19
Payer: COMMERCIAL

## 2019-06-19 VITALS
RESPIRATION RATE: 10 BRPM | BODY MASS INDEX: 19.99 KG/M2 | DIASTOLIC BLOOD PRESSURE: 56 MMHG | OXYGEN SATURATION: 99 % | WEIGHT: 120 LBS | HEIGHT: 65 IN | HEART RATE: 57 BPM | SYSTOLIC BLOOD PRESSURE: 100 MMHG | TEMPERATURE: 97.9 F

## 2019-06-19 DIAGNOSIS — Z00.00 ROUTINE GENERAL MEDICAL EXAMINATION AT A HEALTH CARE FACILITY: Primary | ICD-10-CM

## 2019-06-19 PROCEDURE — G0145 SCR C/V CYTO,THINLAYER,RESCR: HCPCS | Performed by: FAMILY MEDICINE

## 2019-06-19 PROCEDURE — 99396 PREV VISIT EST AGE 40-64: CPT | Performed by: FAMILY MEDICINE

## 2019-06-19 PROCEDURE — 87624 HPV HI-RISK TYP POOLED RSLT: CPT | Performed by: FAMILY MEDICINE

## 2019-06-19 RX ORDER — FLUTICASONE PROPIONATE 50 MCG
1 SPRAY, SUSPENSION (ML) NASAL DAILY
COMMUNITY

## 2019-06-19 RX ORDER — FEXOFENADINE HCL 180 MG/1
180 TABLET ORAL DAILY
COMMUNITY

## 2019-06-19 ASSESSMENT — ANXIETY QUESTIONNAIRES
3. WORRYING TOO MUCH ABOUT DIFFERENT THINGS: NOT AT ALL
GAD7 TOTAL SCORE: 0
2. NOT BEING ABLE TO STOP OR CONTROL WORRYING: NOT AT ALL
7. FEELING AFRAID AS IF SOMETHING AWFUL MIGHT HAPPEN: NOT AT ALL
5. BEING SO RESTLESS THAT IT IS HARD TO SIT STILL: NOT AT ALL
6. BECOMING EASILY ANNOYED OR IRRITABLE: NOT AT ALL
IF YOU CHECKED OFF ANY PROBLEMS ON THIS QUESTIONNAIRE, HOW DIFFICULT HAVE THESE PROBLEMS MADE IT FOR YOU TO DO YOUR WORK, TAKE CARE OF THINGS AT HOME, OR GET ALONG WITH OTHER PEOPLE: NOT DIFFICULT AT ALL
1. FEELING NERVOUS, ANXIOUS, OR ON EDGE: NOT AT ALL

## 2019-06-19 ASSESSMENT — PATIENT HEALTH QUESTIONNAIRE - PHQ9
SUM OF ALL RESPONSES TO PHQ QUESTIONS 1-9: 0
5. POOR APPETITE OR OVEREATING: NOT AT ALL

## 2019-06-19 ASSESSMENT — MIFFLIN-ST. JEOR: SCORE: 1151.45

## 2019-06-19 NOTE — PROGRESS NOTES
SUBJECTIVE:   CC: Carmen Sánchez is an 54 year old woman who presents for preventive health visit.     Healthy Habits:    Do you get at least three servings of calcium containing foods daily (dairy, green leafy vegetables, etc.)? yes    Amount of exercise or daily activities, outside of work: 5-7 day(s) per week    Problems taking medications regularly No    Medication side effects: No    Have you had an eye exam in the past two years? yes    Do you see a dentist twice per year? yes    Do you have sleep apnea, excessive snoring or daytime drowsiness?no          Today's PHQ-2 Score:   PHQ-2 ( 1999 Pf[employer]) 6/19/2019 12/4/2017   Q1: Little interest or pleasure in doing things 0 0   Q2: Feeling down, depressed or hopeless 0 0   PHQ-2 Score 0 0   Q1: Little interest or pleasure in doing things - -   Q2: Feeling down, depressed or hopeless - -   PHQ-2 Score - -       Abuse: Current or Past(Physical, Sexual or Emotional)- No  Do you feel safe in your environment? Yes    Social History     Tobacco Use     Smoking status: Never Smoker     Smokeless tobacco: Never Used   Substance Use Topics     Alcohol use: Yes     Alcohol/week: 2.4 - 3.0 oz     Types: 4 - 5 Standard drinks or equivalent per week     Comment: 4-5/wk     If you drink alcohol do you typically have >3 drinks per day or >7 drinks per week? No                     Reviewed orders with patient.  Reviewed health maintenance and updated orders accordingly - Yes  Patient Active Problem List   Diagnosis     Pruritus ani     CARDIOVASCULAR SCREENING; LDL GOAL LESS THAN 160     Esophageal reflux     Spina bifida occulta     Chronic SI joint pain     Gastric ulcer     Migraine without aura and without status migrainosus, not intractable     Seasonal allergic rhinitis     Unexplained endometrial cells on cervical Pap smear     Past Surgical History:   Procedure Laterality Date     BIOPSY CERVICAL, LOCAL EXCISION, SINGLE/MULTIPLE  2010    excision benign cervical polyp         Social History     Tobacco Use     Smoking status: Never Smoker     Smokeless tobacco: Never Used   Substance Use Topics     Alcohol use: Yes     Alcohol/week: 2.4 - 3.0 oz     Types: 4 - 5 Standard drinks or equivalent per week     Comment: 4-5/wk     Family History   Problem Relation Age of Onset     Cancer Mother         melanoma lower leg at age 63     Arthritis Father         rheumatoid     Cancer Sister 52        Uterine cancer     Heart Disease Maternal Grandmother         MI     Allergies Daughter         hay fever         Current Outpatient Medications   Medication Sig Dispense Refill     fexofenadine (ALLEGRA) 180 MG tablet Take 180 mg by mouth daily       fluticasone (FLONASE) 50 MCG/ACT nasal spray Spray 1 spray into both nostrils daily       rizatriptan (MAXALT) 5 MG tablet Take 1-2 tablets (5-10 mg) by mouth at onset of headache for migraine May repeat in 2 hours. Max 6 tablets/24 hours. 18 tablet 3     Allergies   Allergen Reactions     Metronidazole      hives     Minocin      Penicillins      anaphylaxis       Mammogram Screening: Patient over age 50, mutual decision to screen reflected in health maintenance.    Pertinent mammograms are reviewed under the imaging tab.  History of abnormal Pap smear: NO - age 30-65 PAP every 5 years with negative HPV co-testing recommended  PAP / HPV Latest Ref Rng & Units 6/19/2019 12/4/2017 6/17/2016   PAP - NIL OTHER-NIL, See Result OTHER-NIL, See Result   HPV 16 DNA NEG:Negative Negative Negative -   HPV 18 DNA NEG:Negative Negative Negative -   OTHER HR HPV NEG:Negative Negative Negative -     Reviewed and updated as needed this visit by clinical staff  Tobacco  Allergies  Meds         Reviewed and updated as needed this visit by Provider  Allergies            ROS:  CONSTITUTIONAL: NEGATIVE for fever, chills, change in weight  INTEGUMENTARY/SKIN: NEGATIVE for worrisome rashes, moles or lesions  EYES: NEGATIVE for vision changes or irritation  ENT:  "NEGATIVE for ear, mouth and throat problems  RESP: NEGATIVE for significant cough or SOB  BREAST: NEGATIVE for masses, tenderness or discharge  CV: NEGATIVE for chest pain, palpitations or peripheral edema  GI: NEGATIVE for nausea, abdominal pain, heartburn, or change in bowel habits  : NEGATIVE for unusual urinary or vaginal symptoms. No vaginal bleeding.  MUSCULOSKELETAL: NEGATIVE for significant arthralgias or myalgia  NEURO: NEGATIVE for weakness, dizziness or paresthesias  PSYCHIATRIC: NEGATIVE for changes in mood or affect     OBJECTIVE:   /56   Pulse 57   Temp 97.9  F (36.6  C) (Tympanic)   Resp 10   Ht 1.661 m (5' 5.39\")   Wt 54.4 kg (120 lb)   SpO2 99%   BMI 19.73 kg/m    EXAM:  GENERAL APPEARANCE: healthy, alert and no distress  EYES: Eyes grossly normal to inspection, PERRL and conjunctivae and sclerae normal  HENT: ear canals and TM's normal, nose and mouth without ulcers or lesions, oropharynx clear and oral mucous membranes moist  NECK: no adenopathy, no asymmetry, masses, or scars and thyroid normal to palpation  RESP: lungs clear to auscultation - no rales, rhonchi or wheezes  BREAST: normal without masses, tenderness or nipple discharge and no palpable axillary masses or adenopathy  CV: regular rate and rhythm, normal S1 S2, no S3 or S4, no murmur, click or rub, no peripheral edema and peripheral pulses strong  ABDOMEN: soft, nontender, no hepatosplenomegaly, no masses and bowel sounds normal   (female): normal female external genitalia, normal urethral meatus, vaginal mucosal atrophy noted, normal cervix, adnexae, and uterus without masses or abnormal discharge  MS: no musculoskeletal defects are noted and gait is age appropriate without ataxia  SKIN: no suspicious lesions or rashes  NEURO: Normal strength and tone, sensory exam grossly normal, mentation intact and speech normal  PSYCH: mentation appears normal and affect normal/bright        ASSESSMENT/PLAN:   1. Routine general " "medical examination at a health care facility    - Pap imaged thin layer screen with HPV - recommended age 30 - 65  - HPV High Risk Types DNA Cervical    COUNSELING:   Reviewed preventive health counseling, as reflected in patient instructions       Regular exercise       Healthy diet/nutrition    Estimated body mass index is 19.73 kg/m  as calculated from the following:    Height as of this encounter: 1.661 m (5' 5.39\").    Weight as of this encounter: 54.4 kg (120 lb).         reports that she has never smoked. She has never used smokeless tobacco.      Counseling Resources:  ATP IV Guidelines  Pooled Cohorts Equation Calculator  Breast Cancer Risk Calculator  FRAX Risk Assessment  ICSI Preventive Guidelines  Dietary Guidelines for Americans, 2010  USDA's MyPlate  ASA Prophylaxis  Lung CA Screening    Raegan Becerril MD  [facility]  "

## 2019-06-20 ASSESSMENT — ANXIETY QUESTIONNAIRES: GAD7 TOTAL SCORE: 0

## 2019-06-21 LAB
COPATH REPORT: NORMAL
PAP: NORMAL

## 2019-06-24 LAB
FINAL DIAGNOSIS: NORMAL
HPV HR 12 DNA CVX QL NAA+PROBE: NEGATIVE
HPV16 DNA SPEC QL NAA+PROBE: NEGATIVE
HPV18 DNA SPEC QL NAA+PROBE: NEGATIVE
SPECIMEN DESCRIPTION: NORMAL
SPECIMEN SOURCE CVX/VAG CYTO: NORMAL

## 2019-10-02 ENCOUNTER — HEALTH MAINTENANCE LETTER (OUTPATIENT)
Age: 54
End: 2019-10-02

## 2021-01-15 ENCOUNTER — HEALTH MAINTENANCE LETTER (OUTPATIENT)
Age: 56
End: 2021-01-15

## 2021-03-21 ENCOUNTER — HEALTH MAINTENANCE LETTER (OUTPATIENT)
Age: 56
End: 2021-03-21

## 2021-09-04 ENCOUNTER — HEALTH MAINTENANCE LETTER (OUTPATIENT)
Age: 56
End: 2021-09-04

## 2022-02-19 ENCOUNTER — HEALTH MAINTENANCE LETTER (OUTPATIENT)
Age: 57
End: 2022-02-19

## 2022-10-22 ENCOUNTER — HEALTH MAINTENANCE LETTER (OUTPATIENT)
Age: 57
End: 2022-10-22

## 2023-04-01 ENCOUNTER — HEALTH MAINTENANCE LETTER (OUTPATIENT)
Age: 58
End: 2023-04-01